# Patient Record
Sex: MALE | Race: WHITE | Employment: FULL TIME | URBAN - METROPOLITAN AREA
[De-identification: names, ages, dates, MRNs, and addresses within clinical notes are randomized per-mention and may not be internally consistent; named-entity substitution may affect disease eponyms.]

---

## 2017-03-07 ENCOUNTER — OFFICE VISIT (OUTPATIENT)
Dept: SURGERY | Age: 37
End: 2017-03-07

## 2017-03-07 VITALS
TEMPERATURE: 98.1 F | WEIGHT: 229 LBS | OXYGEN SATURATION: 99 % | SYSTOLIC BLOOD PRESSURE: 135 MMHG | HEIGHT: 70 IN | HEART RATE: 86 BPM | DIASTOLIC BLOOD PRESSURE: 90 MMHG | RESPIRATION RATE: 18 BRPM | BODY MASS INDEX: 32.78 KG/M2

## 2017-03-07 DIAGNOSIS — M79.89 SOFT TISSUE MASS: Primary | ICD-10-CM

## 2017-03-07 RX ORDER — SODIUM CHLORIDE 0.9 % (FLUSH) 0.9 %
5-10 SYRINGE (ML) INJECTION EVERY 8 HOURS
Status: CANCELLED | OUTPATIENT
Start: 2017-03-07

## 2017-03-07 RX ORDER — SODIUM CHLORIDE 0.9 % (FLUSH) 0.9 %
5-10 SYRINGE (ML) INJECTION AS NEEDED
Status: CANCELLED | OUTPATIENT
Start: 2017-03-07

## 2017-03-07 NOTE — PROGRESS NOTES
HISTORY OF PRESENT ILLNESS  Zheng Easton is a 39 y.o. male. HPI    Review of Systems   Constitutional: Negative. HENT: Positive for tinnitus. Negative for congestion, ear discharge, ear pain, hearing loss, nosebleeds and sore throat. Eyes: Negative. Respiratory: Negative. Negative for stridor. Cardiovascular: Negative. Gastrointestinal: Negative. Genitourinary: Negative. Musculoskeletal: Positive for back pain and myalgias. Negative for falls, joint pain and neck pain. Skin: Negative. Neurological: Positive for headaches. Negative for dizziness, tingling, tremors, sensory change, speech change, focal weakness, seizures and loss of consciousness. Psychiatric/Behavioral: Negative for depression, hallucinations, memory loss, substance abuse and suicidal ideas. The patient is not nervous/anxious and does not have insomnia.         Physical Exam

## 2017-03-07 NOTE — MR AVS SNAPSHOT
Visit Information Date & Time Provider Department Dept. Phone Encounter #  
 3/7/2017 10:30 AM Loly Cai  [address] [id number] Follow-up Instructions Return in about 2 weeks (around 3/21/2017) for Postoperative. Your Appointments 3/29/2017  2:00 PM  
[facility] Appt Note: PO Excisional biopsy of lesion left thigh (frog leg) [address] [place] [address] [address] [place] [address] [address] [facility] Upcoming Health Maintenance Date Due DTaP/Tdap/Td series (1 - Tdap) 3/6/2013 INFLUENZA AGE 9 TO ADULT 8/1/2016 Allergies as of 3/7/2017  Review Complete On: 3/7/2017 By: Adela Morfin LPN No Known Allergies Current Immunizations  Reviewed on 11/20/2014 Name Date Influenza Vaccine 10/1/2014, 10/5/2013 Influenza Vaccine Whole 10/27/2012 Td 3/5/2013 Not reviewed this visit You Were Diagnosed With   
  
 Codes Comments Soft tissue mass    -  Primary ICD-10-CM: M79.9 ICD-9-CM: 729.90 Vitals BP Pulse Temp Resp Height(growth percentile) Weight(growth percentile) 135/90 86 98.1 °F (36.7 °C) (Oral) 18 5' 10\" (1.778 m) 229 lb (103.9 kg) SpO2 BMI Smoking Status 99% 32.86 kg/m2 Never Smoker Vitals History BMI and BSA Data Body Mass Index Body Surface Area  
 32.86 kg/m 2 2.27 m 2 Preferred Pharmacy Pharmacy Name Phone [facility] [address] [phone] Your Updated Medication List  
  
   
This list is accurate as of: 3/7/17 11:38 AM.  Always use your most recent med list.  
  
  
  
  
 cyclobenzaprine 5 mg tablet Commonly known as:  FLEXERIL Take 1 Tab by mouth three (3) times daily as needed.  Indications: FIBROMYALGIA, MUSCLE SPASM  
  
 ibuprofen 800 mg tablet Commonly known as:  MOTRIN Take  by mouth. SUMAtriptan 50 mg tablet Commonly known as:  IMITREX Take 1 Tab by mouth once as needed for Migraine. We Performed the Following SCHEDULE SURGERY [[id number] Custom] Follow-up Instructions Return in about 2 weeks (around 3/21/2017) for Postoperative. Introducing [facility]! Dear Esmer Colon: Thank you for requesting a [employer] account. Our records indicate that you already have an active [employer] account. You can access your account anytime at [email or web address] Did you know that you can access your hospital and ER discharge instructions at any time in [employer]? You can also review all of your test results from your hospital stay or ER visit. Additional Information If you have questions, please visit the Frequently Asked Questions section of the [employer] website at [email or web address] Remember, [employer] is NOT to be used for urgent needs. For medical emergencies, dial 911. Now available from your iPhone and Android! Please provide this summary of care documentation to your next provider. Your primary care clinician is listed as Altagracia Quiros. If you have any questions after today's visit, please call [phone].

## 2017-03-07 NOTE — PROGRESS NOTES
General Surgery Consult    Subjective:      HPI: Patient is a very pleasant 25-year-old male with a past medical history is remarkable for lumbar disc disease, migraine headaches and vitamin D deficiency. He is self-referred for evaluation and management of a lesion in the medial left thigh. States that the mass first appeared approximately 2 years ago is a palpable soft tissue mass. The skin above it then started to turn brown. It has slowly increase in size. Patient Active Problem List    Diagnosis Date Noted    Elevated blood-pressure reading without diagnosis of hypertension 03/22/2013    Mixed hyperlipidemia 03/08/2010    Hyperglycemia 03/08/2010    Headache, migraine 03/08/2010    Lumbar disc disease 03/08/2010    Vitamin D Deficiency Disease (17) 03/08/2010     Past Medical History:   Diagnosis Date    Headache, migraine 3/8/2010    Lumbar disc disease 3/8/2010    Vitamin D deficiency 3/8/2010      Past Surgical History:   Procedure Laterality Date    HX BACK SURGERY      L3-L4  (Dr. Chu Abel), [facility] 92    HX TONSILLECTOMY  12-10-13    Dr. [facility])      Family History   Problem Relation Age of Onset    High Cholesterol Mother     High Cholesterol Maternal Grandmother     Hypertension Maternal Grandmother     Heart Disease Maternal Grandfather 59     AMI- s/p CABG      Social History   Substance Use Topics    Smoking status: Never Smoker    Smokeless tobacco: Never Used    Alcohol use Yes      Comment: 2 cans beer a week      No Known Allergies    Prior to Admission medications    Medication Sig Start Date End Date Taking? Authorizing Provider   ibuprofen (MOTRIN) 800 mg tablet Take  by mouth. Yes Historical Provider   cyclobenzaprine (FLEXERIL) 5 mg tablet Take 1 Tab by mouth three (3) times daily as needed. Indications: FIBROMYALGIA, MUSCLE SPASM 11/27/12  Yes Cheryle Baron, MD   SUMAtriptan (IMITREX) 50 mg tablet Take 1 Tab by mouth once as needed for Migraine. 11/27/12   Champ Sanchez MD       Review of Systems:    14 systems were reviewed. The results are as above in the HPI and otherwise negative. Objective:     Vitals:    03/07/17 1111   BP: 135/90   Pulse: 86   Resp: 18   Temp: 98.1 °F (36.7 °C)   TempSrc: Oral   SpO2: 99%   Weight: 103.9 kg (229 lb)   Height: 5' 10\" (1.778 m)       Physical Exam:  GENERAL: alert, cooperative, no distress, appears stated age,   EYE: conjunctivae/corneas clear. PERRL, EOM's intact. THROAT & NECK: normal and no erythema or exudates noted. ,    LYMPHATIC: Cervical, supraclavicular, and axillary nodes normal. ,   LUNG: clear to auscultation bilaterally,   HEART: regular rate and rhythm, S1, S2 normal, no murmur, click, rub or gallop,   ABDOMEN: soft, non-tender. Bowel sounds normal. No masses,  no organomegaly,   EXTREMITIES:  extremities normal, atraumatic, no cyanosis or edema,   SKIN: 0.5 cm raised firm lesion in the medial left thigh with brown hyperpigmentation of the skin overlying the lesion. NEUROLOGIC: AOx3. Cranial nerves 2-12 and sensation grossly intact. ,     Data Review:  to be done    Impression:     · Patient with a skin lesion of the left medial thigh of unclear etiology suspicious for malignancy.     Plan:     · Excisional biopsy of the skin lesion of the left medial thigh  · Consent on chart  · Preoperative orders written    Signed By: Nemesio Oliva MD     March 7, 2017

## 2017-03-08 RX ORDER — CHOLECALCIFEROL (VITAMIN D3) 125 MCG
CAPSULE ORAL DAILY
COMMUNITY

## 2017-03-14 DIAGNOSIS — M79.89 SOFT TISSUE MASS: Primary | ICD-10-CM

## 2017-03-15 ENCOUNTER — HOSPITAL ENCOUNTER (OUTPATIENT)
Dept: LAB | Age: 37
Discharge: HOME | End: 2017-03-15
Payer: COMMERCIAL

## 2017-03-15 ENCOUNTER — HOSPITAL ENCOUNTER (OUTPATIENT)
Dept: GENERAL RADIOLOGY | Age: 37
Discharge: HOME | End: 2017-03-15
Payer: COMMERCIAL

## 2017-03-15 DIAGNOSIS — M79.89 SOFT TISSUE MASS: ICD-10-CM

## 2017-03-15 DIAGNOSIS — R22.40 MASS OF THIGH, UNSPECIFIED LATERALITY: Primary | ICD-10-CM

## 2017-03-15 LAB
ALBUMIN SERPL BCP-MCNC: 4.4 G/DL (ref 3.4–5)
ALBUMIN/GLOB SERPL: 1.3 {RATIO} (ref 0.8–1.7)
ALP SERPL-CCNC: 68 U/L (ref 45–117)
ALT SERPL-CCNC: 31 U/L (ref 16–61)
ANION GAP BLD CALC-SCNC: 5 MMOL/L (ref 3–18)
AST SERPL W P-5'-P-CCNC: 15 U/L (ref 15–37)
BILIRUB SERPL-MCNC: 0.7 MG/DL (ref 0.2–1)
BUN SERPL-MCNC: 11 MG/DL (ref 7–18)
BUN/CREAT SERPL: 12 (ref 12–20)
CALCIUM SERPL-MCNC: 9.1 MG/DL (ref 8.5–10.1)
CHLORIDE SERPL-SCNC: 105 MMOL/L (ref 100–108)
CO2 SERPL-SCNC: 30 MMOL/L (ref 21–32)
CREAT SERPL-MCNC: 0.93 MG/DL (ref 0.6–1.3)
GLOBULIN SER CALC-MCNC: 3.3 G/DL (ref 2–4)
GLUCOSE SERPL-MCNC: 89 MG/DL (ref 74–99)
POTASSIUM SERPL-SCNC: 4.2 MMOL/L (ref 3.5–5.5)
PROT SERPL-MCNC: 7.7 G/DL (ref 6.4–8.2)
SODIUM SERPL-SCNC: 140 MMOL/L (ref 136–145)

## 2017-03-15 PROCEDURE — 93005 ELECTROCARDIOGRAM TRACING: CPT

## 2017-03-15 PROCEDURE — 71020 XR CHEST PA LAT: CPT

## 2017-03-15 PROCEDURE — 36415 COLL VENOUS BLD VENIPUNCTURE: CPT | Performed by: SURGERY

## 2017-03-15 PROCEDURE — 80053 COMPREHEN METABOLIC PANEL: CPT | Performed by: SURGERY

## 2017-03-16 ENCOUNTER — ANESTHESIA EVENT (OUTPATIENT)
Dept: SURGERY | Age: 37
End: 2017-03-16
Payer: COMMERCIAL

## 2017-03-16 ENCOUNTER — HOSPITAL ENCOUNTER (OUTPATIENT)
Dept: LAB | Age: 37
Discharge: HOME | End: 2017-03-16
Payer: COMMERCIAL

## 2017-03-16 LAB
ATRIAL RATE: 74 BPM
BASOPHILS # BLD AUTO: 0 K/UL (ref 0–0.1)
BASOPHILS # BLD: 0 % (ref 0–2)
CALCULATED P AXIS, ECG09: 19 DEGREES
CALCULATED R AXIS, ECG10: 53 DEGREES
CALCULATED T AXIS, ECG11: 9 DEGREES
DIAGNOSIS, 93000: NORMAL
DIFFERENTIAL METHOD BLD: ABNORMAL
EOSINOPHIL # BLD: 0 K/UL (ref 0–0.4)
EOSINOPHIL NFR BLD: 1 % (ref 0–5)
ERYTHROCYTE [DISTWIDTH] IN BLOOD BY AUTOMATED COUNT: 12.9 % (ref 11.6–14.5)
HCT VFR BLD AUTO: 44.5 % (ref 36–48)
HGB BLD-MCNC: 14.7 G/DL (ref 13–16)
LYMPHOCYTES # BLD AUTO: 29 % (ref 21–52)
LYMPHOCYTES # BLD: 1.5 K/UL (ref 0.9–3.6)
MCH RBC QN AUTO: 31.3 PG (ref 24–34)
MCHC RBC AUTO-ENTMCNC: 33 G/DL (ref 31–37)
MCV RBC AUTO: 94.7 FL (ref 74–97)
MONOCYTES # BLD: 0.5 K/UL (ref 0.05–1.2)
MONOCYTES NFR BLD AUTO: 9 % (ref 3–10)
NEUTS SEG # BLD: 3.3 K/UL (ref 1.8–8)
NEUTS SEG NFR BLD AUTO: 61 % (ref 40–73)
P-R INTERVAL, ECG05: 158 MS
PLATELET # BLD AUTO: 132 K/UL (ref 135–420)
PMV BLD AUTO: 11.9 FL (ref 9.2–11.8)
Q-T INTERVAL, ECG07: 380 MS
QRS DURATION, ECG06: 96 MS
QTC CALCULATION (BEZET), ECG08: 421 MS
RBC # BLD AUTO: 4.7 M/UL (ref 4.7–5.5)
VENTRICULAR RATE, ECG03: 74 BPM
WBC # BLD AUTO: 5.4 K/UL (ref 4.6–13.2)

## 2017-03-16 PROCEDURE — 85025 COMPLETE CBC W/AUTO DIFF WBC: CPT | Performed by: SURGERY

## 2017-03-16 PROCEDURE — 36415 COLL VENOUS BLD VENIPUNCTURE: CPT | Performed by: SURGERY

## 2017-03-17 ENCOUNTER — HOSPITAL ENCOUNTER (OUTPATIENT)
Age: 37
Setting detail: OUTPATIENT SURGERY
Discharge: HOME | End: 2017-03-17
Attending: SURGERY | Admitting: SURGERY
Payer: COMMERCIAL

## 2017-03-17 ENCOUNTER — ANESTHESIA (OUTPATIENT)
Dept: SURGERY | Age: 37
End: 2017-03-17
Payer: COMMERCIAL

## 2017-03-17 ENCOUNTER — SURGERY (OUTPATIENT)
Age: 37
End: 2017-03-17

## 2017-03-17 VITALS
HEIGHT: 70 IN | BODY MASS INDEX: 32.21 KG/M2 | HEART RATE: 75 BPM | WEIGHT: 225 LBS | OXYGEN SATURATION: 96 % | DIASTOLIC BLOOD PRESSURE: 88 MMHG | SYSTOLIC BLOOD PRESSURE: 131 MMHG | RESPIRATION RATE: 12 BRPM | TEMPERATURE: 97.8 F

## 2017-03-17 PROCEDURE — 74011000250 HC RX REV CODE- 250

## 2017-03-17 PROCEDURE — 76060000032 HC ANESTHESIA 0.5 TO 1 HR: Performed by: SURGERY

## 2017-03-17 PROCEDURE — 74011000250 HC RX REV CODE- 250: Performed by: SURGERY

## 2017-03-17 PROCEDURE — 74011250636 HC RX REV CODE- 250/636: Performed by: SURGERY

## 2017-03-17 PROCEDURE — 76210000021 HC REC RM PH II 0.5 TO 1 HR: Performed by: SURGERY

## 2017-03-17 PROCEDURE — 76210000016 HC OR PH I REC 1 TO 1.5 HR: Performed by: SURGERY

## 2017-03-17 PROCEDURE — 88304 TISSUE EXAM BY PATHOLOGIST: CPT | Performed by: SURGERY

## 2017-03-17 PROCEDURE — 77030011640 HC PAD GRND REM COVD -A: Performed by: SURGERY

## 2017-03-17 PROCEDURE — 77030003028 HC SUT VCRL J&J -A: Performed by: SURGERY

## 2017-03-17 PROCEDURE — 74011250636 HC RX REV CODE- 250/636: Performed by: NURSE ANESTHETIST, CERTIFIED REGISTERED

## 2017-03-17 PROCEDURE — 88305 TISSUE EXAM BY PATHOLOGIST: CPT | Performed by: SURGERY

## 2017-03-17 PROCEDURE — 77030020782 HC GWN BAIR PAWS FLX 3M -B: Performed by: SURGERY

## 2017-03-17 PROCEDURE — 77030031139 HC SUT VCRL2 J&J -A: Performed by: SURGERY

## 2017-03-17 PROCEDURE — 76010000138 HC OR TIME 0.5 TO 1 HR: Performed by: SURGERY

## 2017-03-17 PROCEDURE — 77030010509 HC AIRWY LMA MSK TELE -A: Performed by: ANESTHESIOLOGY

## 2017-03-17 PROCEDURE — 74011250636 HC RX REV CODE- 250/636

## 2017-03-17 RX ORDER — SODIUM CHLORIDE 0.9 % (FLUSH) 0.9 %
5-10 SYRINGE (ML) INJECTION AS NEEDED
Status: DISCONTINUED | OUTPATIENT
Start: 2017-03-17 | End: 2017-03-17 | Stop reason: HOSPADM

## 2017-03-17 RX ORDER — ONDANSETRON 2 MG/ML
INJECTION INTRAMUSCULAR; INTRAVENOUS AS NEEDED
Status: DISCONTINUED | OUTPATIENT
Start: 2017-03-17 | End: 2017-03-17 | Stop reason: HOSPADM

## 2017-03-17 RX ORDER — DEXAMETHASONE SODIUM PHOSPHATE 4 MG/ML
INJECTION, SOLUTION INTRA-ARTICULAR; INTRALESIONAL; INTRAMUSCULAR; INTRAVENOUS; SOFT TISSUE AS NEEDED
Status: DISCONTINUED | OUTPATIENT
Start: 2017-03-17 | End: 2017-03-17 | Stop reason: HOSPADM

## 2017-03-17 RX ORDER — SODIUM CHLORIDE, SODIUM LACTATE, POTASSIUM CHLORIDE, CALCIUM CHLORIDE 600; 310; 30; 20 MG/100ML; MG/100ML; MG/100ML; MG/100ML
50 INJECTION, SOLUTION INTRAVENOUS CONTINUOUS
Status: DISCONTINUED | OUTPATIENT
Start: 2017-03-17 | End: 2017-03-17 | Stop reason: HOSPADM

## 2017-03-17 RX ORDER — MIDAZOLAM HYDROCHLORIDE 1 MG/ML
INJECTION, SOLUTION INTRAMUSCULAR; INTRAVENOUS AS NEEDED
Status: DISCONTINUED | OUTPATIENT
Start: 2017-03-17 | End: 2017-03-17 | Stop reason: HOSPADM

## 2017-03-17 RX ORDER — SODIUM CHLORIDE, SODIUM LACTATE, POTASSIUM CHLORIDE, CALCIUM CHLORIDE 600; 310; 30; 20 MG/100ML; MG/100ML; MG/100ML; MG/100ML
75 INJECTION, SOLUTION INTRAVENOUS CONTINUOUS
Status: DISCONTINUED | OUTPATIENT
Start: 2017-03-17 | End: 2017-03-17 | Stop reason: HOSPADM

## 2017-03-17 RX ORDER — ONDANSETRON 2 MG/ML
4 INJECTION INTRAMUSCULAR; INTRAVENOUS AS NEEDED
Status: DISCONTINUED | OUTPATIENT
Start: 2017-03-17 | End: 2017-03-17 | Stop reason: HOSPADM

## 2017-03-17 RX ORDER — ACETAMINOPHEN 325 MG/1
650 TABLET ORAL 3 TIMES DAILY
Status: DISCONTINUED | OUTPATIENT
Start: 2017-03-17 | End: 2017-03-17 | Stop reason: HOSPADM

## 2017-03-17 RX ORDER — FENTANYL CITRATE 50 UG/ML
25 INJECTION, SOLUTION INTRAMUSCULAR; INTRAVENOUS
Status: DISCONTINUED | OUTPATIENT
Start: 2017-03-17 | End: 2017-03-17 | Stop reason: CLARIF

## 2017-03-17 RX ORDER — PROPOFOL 10 MG/ML
INJECTION, EMULSION INTRAVENOUS AS NEEDED
Status: DISCONTINUED | OUTPATIENT
Start: 2017-03-17 | End: 2017-03-17 | Stop reason: HOSPADM

## 2017-03-17 RX ORDER — BUPIVACAINE HYDROCHLORIDE AND EPINEPHRINE 2.5; 5 MG/ML; UG/ML
INJECTION, SOLUTION EPIDURAL; INFILTRATION; INTRACAUDAL; PERINEURAL AS NEEDED
Status: DISCONTINUED | OUTPATIENT
Start: 2017-03-17 | End: 2017-03-17 | Stop reason: HOSPADM

## 2017-03-17 RX ORDER — FENTANYL CITRATE 50 UG/ML
INJECTION, SOLUTION INTRAMUSCULAR; INTRAVENOUS AS NEEDED
Status: DISCONTINUED | OUTPATIENT
Start: 2017-03-17 | End: 2017-03-17 | Stop reason: HOSPADM

## 2017-03-17 RX ORDER — FENTANYL CITRATE 50 UG/ML
25 INJECTION, SOLUTION INTRAMUSCULAR; INTRAVENOUS
Status: DISCONTINUED | OUTPATIENT
Start: 2017-03-17 | End: 2017-03-17 | Stop reason: HOSPADM

## 2017-03-17 RX ORDER — SODIUM CHLORIDE 0.9 % (FLUSH) 0.9 %
5-10 SYRINGE (ML) INJECTION EVERY 8 HOURS
Status: DISCONTINUED | OUTPATIENT
Start: 2017-03-17 | End: 2017-03-17 | Stop reason: HOSPADM

## 2017-03-17 RX ORDER — FAMOTIDINE 20 MG/1
20 TABLET, FILM COATED ORAL ONCE
Status: DISCONTINUED | OUTPATIENT
Start: 2017-03-17 | End: 2017-03-17 | Stop reason: HOSPADM

## 2017-03-17 RX ORDER — LIDOCAINE HYDROCHLORIDE 20 MG/ML
INJECTION, SOLUTION EPIDURAL; INFILTRATION; INTRACAUDAL; PERINEURAL AS NEEDED
Status: DISCONTINUED | OUTPATIENT
Start: 2017-03-17 | End: 2017-03-17 | Stop reason: HOSPADM

## 2017-03-17 RX ORDER — CEFAZOLIN SODIUM 2 G/50ML
2 SOLUTION INTRAVENOUS
Status: COMPLETED | OUTPATIENT
Start: 2017-03-17 | End: 2017-03-17

## 2017-03-17 RX ORDER — TRAMADOL HYDROCHLORIDE 50 MG/1
100 TABLET ORAL
Qty: 50 TAB | Refills: 0 | Status: SHIPPED | OUTPATIENT
Start: 2017-03-17

## 2017-03-17 RX ADMIN — BUPIVACAINE HYDROCHLORIDE AND EPINEPHRINE BITARTRATE 20 ML: 2.5; .0091 INJECTION, SOLUTION EPIDURAL; INFILTRATION; INTRACAUDAL; PERINEURAL at 08:09

## 2017-03-17 RX ADMIN — FENTANYL CITRATE 25 MCG: 50 INJECTION, SOLUTION INTRAMUSCULAR; INTRAVENOUS at 07:52

## 2017-03-17 RX ADMIN — FENTANYL CITRATE 25 MCG: 50 INJECTION, SOLUTION INTRAMUSCULAR; INTRAVENOUS at 07:45

## 2017-03-17 RX ADMIN — ONDANSETRON 4 MG: 2 INJECTION INTRAMUSCULAR; INTRAVENOUS at 08:07

## 2017-03-17 RX ADMIN — SODIUM CHLORIDE, SODIUM LACTATE, POTASSIUM CHLORIDE, AND CALCIUM CHLORIDE 50 ML/HR: 600; 310; 30; 20 INJECTION, SOLUTION INTRAVENOUS at 06:56

## 2017-03-17 RX ADMIN — LIDOCAINE HYDROCHLORIDE 60 MG: 20 INJECTION, SOLUTION EPIDURAL; INFILTRATION; INTRACAUDAL; PERINEURAL at 07:45

## 2017-03-17 RX ADMIN — PROPOFOL 200 MG: 10 INJECTION, EMULSION INTRAVENOUS at 07:45

## 2017-03-17 RX ADMIN — MIDAZOLAM HYDROCHLORIDE 2 MG: 1 INJECTION, SOLUTION INTRAMUSCULAR; INTRAVENOUS at 07:30

## 2017-03-17 RX ADMIN — DEXAMETHASONE SODIUM PHOSPHATE 4 MG: 4 INJECTION, SOLUTION INTRA-ARTICULAR; INTRALESIONAL; INTRAMUSCULAR; INTRAVENOUS; SOFT TISSUE at 07:52

## 2017-03-17 RX ADMIN — CEFAZOLIN SODIUM 2 G: 2 SOLUTION INTRAVENOUS at 07:30

## 2017-03-17 RX ADMIN — FENTANYL CITRATE 25 MCG: 50 INJECTION INTRAMUSCULAR; INTRAVENOUS at 09:09

## 2017-03-17 NOTE — IP AVS SNAPSHOT
Pedrito Stevens 
 
 
 [address] [facility] [address] [phone] Patient: Jenny Wyman MRN: [id number] :1980 You are allergic to the following Allergen Reactions Hydrocodone Swelling Other (comments) Headaches Swelling of tongue 
insomnia Recent Documentation Height Weight BMI Smoking Status 1.778 m 102.1 kg 32.28 kg/m2 Never Smoker Emergency Contacts Name Discharge Info Relation Home Work Mobile Guadalupe Galvin DISCHARGE CAREGIVER [3] Spouse [3] [phone] About your hospitalization You were admitted on:  2017 You last received care in the:  [facility] PACU You were discharged on:  2017 Unit phone number:  [phone] Why you were hospitalized Your primary diagnosis was:  Not on File Providers Seen During Your Hospitalizations Provider Role Specialty Primary office phone Jenni Napoles MD Attending Provider General Surgery [phone] Your Primary Care Physician (PCP) Primary Care Physician Office Phone Office Fax OTHER, PHYS ** None ** ** None ** Follow-up Information Follow up With Details Comments Contact Info Ada Gregorio MD   Patient can only remember the practice name and not the physician Jenni Napoles MD Follow up in 2 week(s)  [address] [facility] [address] [facility] 
[phone] Your Appointments 2017  2:00 PM EDT Follow Up with MD SHY [facility] [address] [facility]  
[phone] Current Discharge Medication List  
  
START taking these medications Dose & Instructions Dispensing Information Comments Morning Noon Evening Bedtime  
 traMADol 50 mg tablet Commonly known as:  Agustín Montesinos  
   
 Your last dose was: Your next dose is:    
   
   
 Dose:  100 mg Take 2 Tabs by mouth every six (6) hours as needed for Pain. Max Daily Amount: 400 mg. Quantity:  50 Tab Refills:  0 CONTINUE these medications which have NOT CHANGED Dose & Instructions Dispensing Information Comments Morning Noon Evening Bedtime  
 cyclobenzaprine 5 mg tablet Commonly known as:  FLEXERIL Your last dose was: Your next dose is:    
   
   
 Dose:  5 mg Take 1 Tab by mouth three (3) times daily as needed. Indications: FIBROMYALGIA, MUSCLE SPASM Quantity:  30 Tab Refills:  1 FISH OIL PO Your last dose was: Your next dose is:    
   
   
 Dose:  2 Cap Take 2 Caps by mouth. Refills:  0  
     
   
   
   
  
 ibuprofen 800 mg tablet Commonly known as:  MOTRIN Your last dose was: Your next dose is: Take  by mouth. Refills:  0 SUMAtriptan 50 mg tablet Commonly known as:  IMITREX Your last dose was: Your next dose is:    
   
   
 Dose:  50 mg Take 1 Tab by mouth once as needed for Migraine. Quantity:  10 Tab Refills:  2 VITAMIN D3 2,000 unit Tab Generic drug:  cholecalciferol (vitamin D3) Your last dose was: Your next dose is: Take  by mouth daily. Refills:  0 Where to Get Your Medications Information on where to get these meds will be given to you by the nurse or doctor. ! Ask your nurse or doctor about these medications  
  traMADol 50 mg tablet Discharge Instructions Pt may shower. Allow soap and water to run over the incision. No driving or operating heavy machinery while on narcotic pain medications. No strenuous activity or contact sports for two weeks. No lifting greater than 15 lbs for 2 weeks. Call MD for any redness, swelling, bleeding or pus at the incision. Also call for any nausea, vomiting, increased pain or pain uncontrolled by pain medicine. Melanoma: Care Instructions Your Care Instructions Melanoma is a form of skin cancer in which abnormal skin cells grow out of control. It helps to learn about this condition and what can be done about it. It is very important for you to take good care of your skin from now on so that you do not get melanoma again. After treatment, you will need regular checkups with your doctor to make sure melanoma has not come back. Your doctor also will watch to be sure that you do not develop another melanoma. Melanoma shows up mostly on skin that is not regularly covered up, but it can show up anywhere on the body. Melanoma is most often found early, when it can be cured. The most common treatment is surgery to remove it. Sometimes lymph nodes near the cancer also are removed. You also may get medicine that kills cancer cells (chemotherapy) or medicine that boosts your immune system (immunotherapy). You may want plastic surgery if you have a very noticeable scar after the surgery. When you find out that you have cancer, you may feel many emotions and may need some help coping. Seek out family, friends, and counselors for support. You also can do things at home to make yourself feel better while you go through treatment. Call the [facility] ([phone]) or visit its website at [address] [employer] for more information. Follow-up care is a key part of your treatment and safety. Be sure to make and go to all appointments, and call your doctor if you are having problems. It's also a good idea to know your test results and keep a list of the medicines you take. How can you care for yourself at home? · Learn the most important warning signs for melanomaa change in the size, shape, or color of a mole or other skin growth, such as a birthmark. · Check all the skin on your body once a month for skin growths or other changes, such as changes in color and feel of the skin. ¨  front of a full-length mirror. Look carefully at the front and back of your body. Then look at your right and left sides with your arms raised. PA[facility] your elbows and look carefully at your forearms, the back of your upper arms, and your palms. ¨ Look at your feet, the bottoms of your feet, and the spaces between your toes. ¨ Use a hand mirror to look at the back of your legs, the back of your neck, and your back, rear end (buttocks), and genital area. Part the hair on your head to look at your scalp. · If you see a change in a skin growth, contact your doctor. Look for: ¨ A mole that bleeds. ¨ A fast-growing mole. ¨ A scaly or crusted growth on the skin. ¨ A sore that will not heal. 
· Take your medicines exactly as prescribed. Call your doctor if you think you are having a problem with your medicine. You will get more details on the specific medicines your doctor prescribes. · If you have pain, follow your doctor's instructions to relieve it. Pain from cancer and surgery can almost always be controlled. Use pain medicine when you first notice pain, before it becomes severe. · Eat healthy food. If you do not feel like eating, try to eat food that has protein and extra calories to keep up your strength and prevent weight loss. Drink liquid meal replacements for extra calories and protein. Try to eat your main meal early. · Get some physical activity every day, but do not get too tired. Keep doing the hobbies you enjoy as your energy allows. · Take steps to control your stress and workload. Learn relaxation techniques. ¨ Share your feelings. Stress and tension affect our emotions. By expressing your feelings to others, you may be able to understand and cope with them. ¨ Consider joining a support group.  Talking about a problem with your spouse, a good friend, or other people with similar problems is a good way to reduce tension and stress. ¨ Express yourself through art. Try writing, crafts, dance, or art to relieve stress. Some dance, writing, or art groups may be available just for people who have cancer. ¨ Be kind to your body and mind. Getting enough sleep, eating a healthy diet, and taking time to do things you enjoy can contribute to an overall feeling of balance in your life and help reduce stress. ¨ Get help if you need it. Discuss your concerns with your doctor or counselor. · If you are vomiting or have diarrhea: ¨ Drink plenty of fluids (enough so that your urine is light yellow or clear like water) to prevent dehydration. Choose water and other caffeine-free clear liquids. If you have kidney, heart, or liver disease and have to limit fluids, talk with your doctor before you increase the amount of fluids you drink. ¨ When you are able to eat, try clear soups, mild foods, and liquids until all symptoms are gone for 12 to 48 hours. Other good choices include dry toast, crackers, cooked cereal, and gelatin dessert, such as Jell-O. 
· Do not smoke. Smoking can slow healing. If you need help quitting, talk to your doctor about stop-smoking programs and medicines. These can increase your chances of quitting for good. · If you have not already done so, prepare a list of advance directives. Advance directives are instructions to your doctor and family members about what kind of care you want if you become unable to speak or express yourself. Protect your skin · Always wear sunscreen on exposed skin. Make sure to use a broad-spectrum sunscreen that has a sun protection factor (SPF) of 30 or higher. Use it every day, even when it is cloudy. While you are outdoors, apply more sunscreen every 2 to 3 hours or anytime your skin gets wet.  
· Wear a wide-brimmed hat, a long-sleeved shirt, and pants if you are going to be outdoors for very long. · Stay out of the sun during the midday hours (10 a.m. to 4 p.m.), when UV rays are strongest. 
· Avoid sunlamps and tanning salons. When should you call for help? Watch closely for changes in your health, and be sure to contact your doctor if: 
· You have a skin growth that now looks different than it did before. It may have changed in size, color, shape, or how it looks. · You have a new growth that does not go away. · You have a skin growth that you are worried about. · You notice swelling in your armpits, groin, or neck. Where can you learn more? Go to http://hubert-vicky.info/. Enter Z413 in the search box to learn more about \"Melanoma: Care Instructions. \" Current as of: July 26, 2016 Content Version: 11.1 © 1373-0827 [employer]. Care instructions adapted under license by [employer] (which disclaims liability or warranty for this information). If you have questions about a medical condition or this instruction, always ask your healthcare professional. [place] [address] any warranty or liability for your use of this information. DISCHARGE SUMMARY from Nurse The following personal items are in your possession at time of discharge: 
 
Dental Appliances: None Visual Aid: Glasses Home Medications: None Jewelry: None Clothing: Pants, Shirt, Footwear, Socks, Undergarments, Jacket/Coat Other Valuables: None PATIENT INSTRUCTIONS: 
 
 
F-face looks uneven A-arms unable to move or move unevenly S-speech slurred or non-existent T-time-call 911 as soon as signs and symptoms begin-DO NOT go Back to bed or wait to see if you get better-TIME IS BRAIN. Warning Signs of HEART ATTACK Call 911 if you have these symptoms: 
? Chest discomfort.  Most heart attacks involve discomfort in the center of the chest that lasts more than a few minutes, or that goes away and comes back. It can feel like uncomfortable pressure, squeezing, fullness, or pain. ? Discomfort in other areas of the upper body. Symptoms can include pain or discomfort in one or both arms, the back, neck, jaw, or stomach. ? Shortness of breath with or without chest discomfort. ? Other signs may include breaking out in a cold sweat, nausea, or lightheadedness. Don't wait more than five minutes to call [address]! Fast action can save your life. Calling 911 is almost always the fastest way to get lifesaving treatment. Emergency Medical Services staff can begin treatment when they arrive  up to an hour sooner than if someone gets to the hospital by car. The discharge information has been reviewed with the patient and spouse. The patient and spouse verbalized understanding. Discharge medications reviewed with the patient and spouse and appropriate educational materials and side effects teaching were provided. Tramadol (By mouth) Tramadol (TRAM-a-dol) Treats moderate to severe pain. This medicine is a narcotic pain reliever. Brand Name(s):ConZip, FusePaq Synapryn, Ryzolt, Ultram, Ultram ER, traMADol HCl There may be other brand names for this medicine. When This Medicine Should Not Be Used: This medicine is not right for everyone. Do not use if you had an allergic reaction to tramadol or other narcotic medicine. Tell your doctor if you have severe asthma or other breathing problems. How to Use This Medicine:  
Liquid, Capsule, Tablet, Dissolving Tablet, Long Acting Tablet · Take your medicine as directed. Your dose may need to be changed several times to find what works best for you. · Make sure your hands are dry before you handle the disintegrating tablet. Peel back the foil from the blister pack, then remove the tablet. Do not push the tablet through the foil. Place the tablet in your mouth. After it has melted, swallow or take a drink of water. · Swallow the extended-release tablet whole. Do not crush, break, or chew it. · Drink plenty of liquids to help avoid constipation. · Missed dose: Take a dose as soon as you remember. If it is almost time for your next dose, wait until then and take a regular dose. Do not take extra medicine to make up for a missed dose. · Store the medicine in a closed container at room temperature, away from heat, moisture, and direct light. Drugs and Foods to Avoid: Ask your doctor or pharmacist before using any other medicine, including over-the-counter medicines, vitamins, and herbal products. · Some medicines and foods can affect how tramadol works. Tell your doctor if you are using an MAO inhibitor or medicine for depression (such as amitriptyline, fluoxetine, paroxetine). · Tell your doctor if you are also using any of the following: 
¨ Gordon's wort, digoxin, erythromycin, ketoconazole, linezolid, lithium, quinidine, rifampin, promethazine, carbamazepine, or cyclobenzaprine ¨ Blood thinner (such as warfarin), medicine for migraine headaches, or another narcotic medicine · Tell your doctor if you use anything else that makes you sleepy. Some examples are allergy medicine, narcotic pain medicine, and alcohol. · Do not drink alcohol while you are using this medicine. Warnings While Using This Medicine: · Tell your doctor if you are pregnant or breastfeeding, or if you have kidney disease, liver disease (including cirrhosis), or breathing problems. Tell your doctor if you have a history of head injury, seizures, drug addiction, or depression or similar emotional problems. · This medicine may cause the following problems: 
¨ High risk of overdose ¨ Serotonin syndrome · This medicine may make you dizzy or drowsy. Do not drive or doing anything else that could be dangerous until you know how this medicine affects you. · This medicine can be habit-forming. Do not use more than your prescribed dose. Call your doctor if you think your medicine is not working. · Do not stop using this medicine suddenly. Your doctor will need to slowly decrease your dose before you stop it completely. · Tell any doctor or dentist who treats you that you are using this medicine. · Some forms of this medicine contain phenylalanine (aspartame). · This medicine may cause constipation, especially with long-term use. Ask your doctor if you should use a laxative to prevent and treat constipation. · Keep all medicine out of the reach of children. Never share your medicine with anyone. Possible Side Effects While Using This Medicine:  
Call your doctor right away if you notice any of these side effects: · Allergic reaction: Itching or hives, swelling in your face or hands, swelling or tingling in your mouth or throat, chest tightness, trouble breathing · Anxiety, restlessness, fast heartbeat, fever, sweating, muscle spasms, nausea, vomiting, diarrhea, seeing or hearing things that are not there · Blistering, peeling, or red skin rash · Lightheadedness, dizziness, or fainting · Seizures · Severe sleepiness or unusual drowsiness · Trouble breathing If you notice these less serious side effects, talk with your doctor: · Feeling nervous or shaky · Headache · Mild itching · Nausea, vomiting, constipation, loss of appetite · Weakness If you notice other side effects that you think are caused by this medicine, tell your doctor. Call your doctor for medical advice about side effects. You may report side effects to FDA at 8-804-FDA-6722 © 2016 [address] is for End User's use only and may not be sold, redistributed or otherwise used for commercial purposes. The above information is an  only. It is not intended as medical advice for individual conditions or treatments.  Talk to your doctor, nurse or pharmacist before following any medical regimen to see if it is safe and effective for you. Discharge Orders None [employer] Announcement We are excited to announce that we are making your provider's discharge notes available to you in [employer]. You will see these notes when they are completed and signed by the physician that discharged you from your recent hospital stay. If you have any questions or concerns about any information you see in [employer], please call the Health Information Department where you were seen or reach out to your Primary Care Provider for more information about your plan of care. Introducing [facility]! Dear Harleen Russell: Thank you for requesting a [employer] account. Our records indicate that you already have an active [employer] account. You can access your account anytime at [email or web address] Did you know that you can access your hospital and ER discharge instructions at any time in [employer]? You can also review all of your test results from your hospital stay or ER visit. Additional Information If you have questions, please visit the Frequently Asked Questions section of the [employer] website at [email or web address] Remember, [employer] is NOT to be used for urgent needs. For medical emergencies, dial 911. Now available from your iPhone and Android! General Information Please provide this summary of care documentation to your next provider. Patient Signature:  ____________________________________________________________ Date:  ____________________________________________________________  
  
Caroline Hogue Provider Signature:  ____________________________________________________________ Date:  ____________________________________________________________

## 2017-03-17 NOTE — ANESTHESIA POSTPROCEDURE EVALUATION
Post-Anesthesia Evaluation and Assessment    Patient: Sanaz Burnette MRN: [id number]  SSN: [id number]    YOB: 1980  Age: 39 y.o. Sex: male      Data from PACU flowsheet    Cardiovascular Function/Vital Signs  Visit Vitals    /88 (BP 1 Location: Left arm, BP Patient Position: At rest)    Pulse 75    Temp 36.6 °C (97.8 °F)    Resp 12    Ht 5' 10\" (1.778 m)    Wt 102.1 kg (225 lb)    SpO2 96%    BMI 32.28 kg/m2       Patient is status post general anesthesia for Procedure(s):  EXCISIONAL BIOPSY OF LESION LEFT THIGH . Nausea/Vomiting: controlled    Postoperative hydration reviewed and adequate. Pain:  Pain Scale 1: Numeric (0 - 10) (03/17/17 0934)  Pain Intensity 1: 3 (03/17/17 0934)   Managed      Mental Status and Level of Consciousness: Alert and oriented     Pulmonary Status:   O2 Device: Room air (03/17/17 0934)   Adequate oxygenation and airway patent    Complications related to anesthesia: None    Post-anesthesia assessment completed.  No concerns    Signed By: Alyssa Abrams MD     March 17, 2017

## 2017-03-17 NOTE — DISCHARGE INSTRUCTIONS
Pt may shower. Allow soap and water to run over the incision. No driving or operating heavy machinery while on narcotic pain medications. No strenuous activity or contact sports for two weeks. No lifting greater than 15 lbs for 2 weeks. Call MD for any redness, swelling, bleeding or pus at the incision. Also call for any nausea, vomiting, increased pain or pain uncontrolled by pain medicine. Melanoma: Care Instructions  Your Care Instructions  Melanoma is a form of skin cancer in which abnormal skin cells grow out of control. It helps to learn about this condition and what can be done about it. It is very important for you to take good care of your skin from now on so that you do not get melanoma again. After treatment, you will need regular checkups with your doctor to make sure melanoma has not come back. Your doctor also will watch to be sure that you do not develop another melanoma. Melanoma shows up mostly on skin that is not regularly covered up, but it can show up anywhere on the body. Melanoma is most often found early, when it can be cured. The most common treatment is surgery to remove it. Sometimes lymph nodes near the cancer also are removed. You also may get medicine that kills cancer cells (chemotherapy) or medicine that boosts your immune system (immunotherapy). You may want plastic surgery if you have a very noticeable scar after the surgery. When you find out that you have cancer, you may feel many emotions and may need some help coping. Seek out family, friends, and counselors for support. You also can do things at home to make yourself feel better while you go through treatment. Call the [employer] (0-206.364.1860) or visit its website at [address] for more information. Follow-up care is a key part of your treatment and safety. Be sure to make and go to all appointments, and call your doctor if you are having problems.  It's also a good idea to know your test results and keep a list of the medicines you take. How can you care for yourself at home? · Learn the most important warning signs for melanoma--a change in the size, shape, or color of a mole or other skin growth, such as a birthmark. · Check all the skin on your body once a month for skin growths or other changes, such as changes in color and feel of the skin. ¨  front of a full-length mirror. Look carefully at the front and back of your body. Then look at your right and left sides with your arms raised. [facility] your elbows and look carefully at your forearms, the back of your upper arms, and your palms. ¨ Look at your feet, the bottoms of your feet, and the spaces between your toes. ¨ Use a hand mirror to look at the back of your legs, the back of your neck, and your back, rear end (buttocks), and genital area. Part the hair on your head to look at your scalp. · If you see a change in a skin growth, contact your doctor. Look for:  ¨ A mole that bleeds. ¨ A fast-growing mole. ¨ A scaly or crusted growth on the skin. ¨ A sore that will not heal.  · Take your medicines exactly as prescribed. Call your doctor if you think you are having a problem with your medicine. You will get more details on the specific medicines your doctor prescribes. · If you have pain, follow your doctor's instructions to relieve it. Pain from cancer and surgery can almost always be controlled. Use pain medicine when you first notice pain, before it becomes severe. · Eat healthy food. If you do not feel like eating, try to eat food that has protein and extra calories to keep up your strength and prevent weight loss. Drink liquid meal replacements for extra calories and protein. Try to eat your main meal early. · Get some physical activity every day, but do not get too tired. Keep doing the hobbies you enjoy as your energy allows. · Take steps to control your stress and workload. Learn relaxation techniques.   ¨ Share your feelings. Stress and tension affect our emotions. By expressing your feelings to others, you may be able to understand and cope with them. ¨ Consider joining a support group. Talking about a problem with your spouse, a good friend, or other people with similar problems is a good way to reduce tension and stress. ¨ Express yourself through art. Try writing, crafts, dance, or art to relieve stress. Some dance, writing, or art groups may be available just for people who have cancer. ¨ Be kind to your body and mind. Getting enough sleep, eating a healthy diet, and taking time to do things you enjoy can contribute to an overall feeling of balance in your life and help reduce stress. ¨ Get help if you need it. Discuss your concerns with your doctor or counselor. · If you are vomiting or have diarrhea:  ¨ Drink plenty of fluids (enough so that your urine is light yellow or clear like water) to prevent dehydration. Choose water and other caffeine-free clear liquids. If you have kidney, heart, or liver disease and have to limit fluids, talk with your doctor before you increase the amount of fluids you drink. ¨ When you are able to eat, try clear soups, mild foods, and liquids until all symptoms are gone for 12 to 48 hours. Other good choices include dry toast, crackers, cooked cereal, and gelatin dessert, such as Jell-O.  · Do not smoke. Smoking can slow healing. If you need help quitting, talk to your doctor about stop-smoking programs and medicines. These can increase your chances of quitting for good. · If you have not already done so, prepare a list of advance directives. Advance directives are instructions to your doctor and family members about what kind of care you want if you become unable to speak or express yourself. Protect your skin  · Always wear sunscreen on exposed skin. Make sure to use a broad-spectrum sunscreen that has a sun protection factor (SPF) of 30 or higher.  Use it every day, even when it is cloudy. While you are outdoors, apply more sunscreen every 2 to 3 hours or anytime your skin gets wet. · Wear a wide-brimmed hat, a long-sleeved shirt, and pants if you are going to be outdoors for very long. · Stay out of the sun during the midday hours (10 a.m. to 4 p.m.), when UV rays are strongest.  · Avoid sunlamps and tanning salons. When should you call for help? Watch closely for changes in your health, and be sure to contact your doctor if:  · You have a skin growth that now looks different than it did before. It may have changed in size, color, shape, or how it looks. · You have a new growth that does not go away. · You have a skin growth that you are worried about. · You notice swelling in your armpits, groin, or neck. Where can you learn more? Go to http://[employer].info/. Enter E970 in the search box to learn more about \"Melanoma: Care Instructions. \"  Current as of: July 26, 2016  Content Version: 11.1  © 3677-0260 [employer]. Care instructions adapted under license by [employer] (which disclaims liability or warranty for this information). If you have questions about a medical condition or this instruction, always ask your healthcare professional. [address] any warranty or liability for your use of this information.     DISCHARGE SUMMARY from Nurse    The following personal items are in your possession at time of discharge:    Dental Appliances: None  Visual Aid: Glasses     Home Medications: None  Jewelry: None  Clothing: Pants, Shirt, Footwear, Socks, Undergarments, Jacket/Coat  Other Valuables: None   PATIENT INSTRUCTIONS:    After general anesthesia or intravenous sedation, for 24 hours or while taking prescription Narcotics:  · Limit your activities  · Do not drive and operate hazardous machinery  · Do not make important personal or business decisions  · Do  not drink alcoholic beverages  · If you have not urinated within 8 hours after discharge, please contact your surgeon on call. Report the following to your surgeon:  · Excessive pain, swelling, redness or odor of or around the surgical area  · Temperature over 100.5  · Nausea and vomiting lasting longer than 4 hours or if unable to take medications  · Any signs of decreased circulation or nerve impairment to extremity: change in color, persistent  numbness, tingling, coldness or increase pain  · Any questions    *  Please give a list of your current medications to your Primary Care Provider. *  Please update this list whenever your medications are discontinued, doses are      changed, or new medications (including over-the-counter products) are added. *  Please carry medication information at all times in case of emergency situations. These are general instructions for a healthy lifestyle:    No smoking/ No tobacco products/ Avoid exposure to second hand smoke    Surgeon General's Warning:  Quitting smoking now greatly reduces serious risk to your health. Obesity, smoking, and sedentary lifestyle greatly increases your risk for illness    A healthy diet, regular physical exercise & weight monitoring are important for maintaining a healthy lifestyle    You may be retaining fluid if you have a history of heart failure or if you experience any of the following symptoms:  Weight gain of 3 pounds or more overnight or 5 pounds in a week, increased swelling in our hands or feet or shortness of breath while lying flat in bed. Please call your doctor as soon as you notice any of these symptoms; do not wait until your next office visit. Recognize signs and symptoms of STROKE:    F-face looks uneven    A-arms unable to move or move unevenly    S-speech slurred or non-existent    T-time-call 911 as soon as signs and symptoms begin-DO NOT go       Back to bed or wait to see if you get better-TIME IS BRAIN.     Warning Signs of HEART ATTACK     Call 911 if you have these symptoms:   Chest discomfort. Most heart attacks involve discomfort in the center of the chest that lasts more than a few minutes, or that goes away and comes back. It can feel like uncomfortable pressure, squeezing, fullness, or pain.  Discomfort in other areas of the upper body. Symptoms can include pain or discomfort in one or both arms, the back, neck, jaw, or stomach.  Shortness of breath with or without chest discomfort.  Other signs may include breaking out in a cold sweat, nausea, or lightheadedness. Don't wait more than five minutes to call 911 - MINUTES MATTER! Fast action can save your life. Calling 911 is almost always the fastest way to get lifesaving treatment. Emergency Medical Services staff can begin treatment when they arrive -- up to an hour sooner than if someone gets to the hospital by car. The discharge information has been reviewed with the patient and spouse. The patient and spouse verbalized understanding. Discharge medications reviewed with the patient and spouse and appropriate educational materials and side effects teaching were provided. Tramadol (By mouth)   Tramadol (TRAM-a-dol)  Treats moderate to severe pain. This medicine is a narcotic pain reliever. Brand Name(s):ConZip, FusePaq Synapryn, Ryzolt, Ultram, Ultram ER, traMADol HCl   There may be other brand names for this medicine. When This Medicine Should Not Be Used: This medicine is not right for everyone. Do not use if you had an allergic reaction to tramadol or other narcotic medicine. Tell your doctor if you have severe asthma or other breathing problems. How to Use This Medicine:   Liquid, Capsule, Tablet, Dissolving Tablet, Long Acting Tablet  · Take your medicine as directed. Your dose may need to be changed several times to find what works best for you. · Make sure your hands are dry before you handle the disintegrating tablet.  Peel back the foil from the blister pack, then remove the tablet. Do not push the tablet through the foil. Place the tablet in your mouth. After it has melted, swallow or take a drink of water. · Swallow the extended-release tablet whole. Do not crush, break, or chew it. · Drink plenty of liquids to help avoid constipation. · Missed dose: Take a dose as soon as you remember. If it is almost time for your next dose, wait until then and take a regular dose. Do not take extra medicine to make up for a missed dose. · Store the medicine in a closed container at room temperature, away from heat, moisture, and direct light. Drugs and Foods to Avoid:   Ask your doctor or pharmacist before using any other medicine, including over-the-counter medicines, vitamins, and herbal products. · Some medicines and foods can affect how tramadol works. Tell your doctor if you are using an MAO inhibitor or medicine for depression (such as amitriptyline, fluoxetine, paroxetine). · Tell your doctor if you are also using any of the following:  ¨ Gordon's wort, digoxin, erythromycin, ketoconazole, linezolid, lithium, quinidine, rifampin, promethazine, carbamazepine, or cyclobenzaprine  ¨ Blood thinner (such as warfarin), medicine for migraine headaches, or another narcotic medicine  · Tell your doctor if you use anything else that makes you sleepy. Some examples are allergy medicine, narcotic pain medicine, and alcohol. · Do not drink alcohol while you are using this medicine. Warnings While Using This Medicine:   · Tell your doctor if you are pregnant or breastfeeding, or if you have kidney disease, liver disease (including cirrhosis), or breathing problems. Tell your doctor if you have a history of head injury, seizures, drug addiction, or depression or similar emotional problems. · This medicine may cause the following problems:  ¨ High risk of overdose  ¨ Serotonin syndrome  · This medicine may make you dizzy or drowsy.  Do not drive or doing anything else that could be dangerous until you know how this medicine affects you. · This medicine can be habit-forming. Do not use more than your prescribed dose. Call your doctor if you think your medicine is not working. · Do not stop using this medicine suddenly. Your doctor will need to slowly decrease your dose before you stop it completely. · Tell any doctor or dentist who treats you that you are using this medicine. · Some forms of this medicine contain phenylalanine (aspartame). · This medicine may cause constipation, especially with long-term use. Ask your doctor if you should use a laxative to prevent and treat constipation. · Keep all medicine out of the reach of children. Never share your medicine with anyone. Possible Side Effects While Using This Medicine:   Call your doctor right away if you notice any of these side effects:  · Allergic reaction: Itching or hives, swelling in your face or hands, swelling or tingling in your mouth or throat, chest tightness, trouble breathing  · Anxiety, restlessness, fast heartbeat, fever, sweating, muscle spasms, nausea, vomiting, diarrhea, seeing or hearing things that are not there  · Blistering, peeling, or red skin rash  · Lightheadedness, dizziness, or fainting  · Seizures  · Severe sleepiness or unusual drowsiness  · Trouble breathing  If you notice these less serious side effects, talk with your doctor:   · Feeling nervous or shaky  · Headache  · Mild itching  · Nausea, vomiting, constipation, loss of appetite  · Weakness  If you notice other side effects that you think are caused by this medicine, tell your doctor. Call your doctor for medical advice about side effects. You may report side effects to FDA at 7-409-NXR-7519  © 2016 [address] is for End User's use only and may not be sold, redistributed or otherwise used for commercial purposes. The above information is an  only.  It is not intended as medical advice for individual conditions or treatments. Talk to your doctor, nurse or pharmacist before following any medical regimen to see if it is safe and effective for you.

## 2017-03-17 NOTE — OP NOTES
[address]    Name:  Swathi Guerra  MR#:  [id number]  :  1980  Account #:  [de-identified]  Date of Adm:  2017  Date of Surgery:  2017      PREOPERATIVE DIAGNOSIS: Skin lesion, left thigh. POSTOPERATIVE DIAGNOSIS: Skin lesion, left thigh. PROCEDURE PERFORMED: Excisional biopsy of lesion of the left  thigh. SURGEON: Андрей Sykes MD.    ASSISTANT: Konstantin Harper. ANESTHESIA: General and local (0.25% Marcaine with epinephrine). FINDINGS: Skin lesions. SPECIMENS REMOVED: Skin lesion. ESTIMATED BLOOD LOSS: 5 mL. FLUIDS GIVEN: 700 mL. OPERATIVE INDICATION: The patient with a dark brown pigmented  lesion of the medial thigh that has been increasing in size and is of  irregular borders. DESCRIPTION OF PROCEDURE: The patient was identified in the  holding area, where consent for excisional biopsy of left thigh mass  was verified. In the operating room, the patient was placed under  general anesthesia. The left thigh was prepped and draped in sterile  fashion using chlorhexidine solution and sterile drapes. Time-out was  performed to ensure correct procedure. The local anesthetic was  infiltrated into the skin and deep dermal tissues surrounding the 1-cm  mass. A 3-cm elliptical incision was made with 0.5 cm smallest margin  carried down through skin and subcutaneous tissue down to the fascia  of the quadriceps muscle using electrocautery in order to perform a  wide local excision of this potentially melanotic mass. The specimen  was passed off the table to be sent for pathology. Irrigation was  performed using saline solution. Electrocautery was used for  hemostasis. A 3-0 Vicryl suture was used to reapproximate the deep  dermal tissues. A 3-0 nylon suture was then used to create a running  subcuticular closure. Dermabond was used as a wound sealant. The  patient tolerated the procedure very well.     DISPOSITION: He was extubated and stable upon transport to the  recovery room.         MD Kenia Gould / PENG  D:  03/17/2017   13:20  T:  03/17/2017   13:38  Job #:  [id number]

## 2017-03-17 NOTE — H&P (VIEW-ONLY)
General Surgery Consult    Subjective:      HPI: Patient is a very pleasant 39-year-old male with a past medical history is remarkable for lumbar disc disease, migraine headaches and vitamin D deficiency. He is self-referred for evaluation and management of a lesion in the medial left thigh. States that the mass first appeared approximately 2 years ago is a palpable soft tissue mass. The skin above it then started to turn brown. It has slowly increase in size. Patient Active Problem List    Diagnosis Date Noted    Elevated blood-pressure reading without diagnosis of hypertension 03/22/2013    Mixed hyperlipidemia 03/08/2010    Hyperglycemia 03/08/2010    Headache, migraine 03/08/2010    Lumbar disc disease 03/08/2010    Vitamin D Deficiency Disease (17) 03/08/2010     Past Medical History:   Diagnosis Date    Headache, migraine 3/8/2010    Lumbar disc disease 3/8/2010    Vitamin D deficiency 3/8/2010      Past Surgical History:   Procedure Laterality Date    HX BACK SURGERY      L3-L4  (Dr. Marla Wilcox), [facility]    HX TONSILLECTOMY  12-10-13    Dr. Naa Vega [facility])      Family History   Problem Relation Age of Onset    High Cholesterol Mother     High Cholesterol Maternal Grandmother     Hypertension Maternal Grandmother     Heart Disease Maternal Grandfather 59     [facility]- s/p CABG      Social History   Substance Use Topics    Smoking status: Never Smoker    Smokeless tobacco: Never Used    Alcohol use Yes      Comment: 2 cans beer a week      No Known Allergies    Prior to Admission medications    Medication Sig Start Date End Date Taking? Authorizing Provider   ibuprofen (MOTRIN) 800 mg tablet Take  by mouth. Yes Historical Provider   cyclobenzaprine (FLEXERIL) 5 mg tablet Take 1 Tab by mouth three (3) times daily as needed. Indications: FIBROMYALGIA, MUSCLE SPASM 11/27/12  Yes Kathleen Dickerson MD   SUMAtriptan (IMITREX) 50 mg tablet Take 1 Tab by mouth once as needed for Migraine. 11/27/12   Terri Castro MD       Review of Systems:    14 systems were reviewed. The results are as above in the HPI and otherwise negative. Objective:     Vitals:    03/07/17 1111   BP: 135/90   Pulse: 86   Resp: 18   Temp: 98.1 °F (36.7 °C)   TempSrc: Oral   SpO2: 99%   Weight: 103.9 kg (229 lb)   Height: 5' 10\" (1.778 m)       Physical Exam:  GENERAL: alert, cooperative, no distress, appears stated age,   EYE: conjunctivae/corneas clear. PERRL, EOM's intact. THROAT & NECK: normal and no erythema or exudates noted. ,    LYMPHATIC: Cervical, supraclavicular, and axillary nodes normal. ,   LUNG: clear to auscultation bilaterally,   HEART: regular rate and rhythm, S1, S2 normal, no murmur, click, rub or gallop,   ABDOMEN: soft, non-tender. Bowel sounds normal. No masses,  no organomegaly,   EXTREMITIES:  extremities normal, atraumatic, no cyanosis or edema,   SKIN: 0.5 cm raised firm lesion in the medial left thigh with brown hyperpigmentation of the skin overlying the lesion. NEUROLOGIC: AOx3. Cranial nerves 2-12 and sensation grossly intact. ,     Data Review:  to be done    Impression:     · Patient with a skin lesion of the left medial thigh of unclear etiology suspicious for malignancy.     Plan:     · Excisional biopsy of the skin lesion of the left medial thigh  · Consent on chart  · Preoperative orders written    Signed By: Saima Babcock MD     March 7, 2017

## 2017-03-17 NOTE — INTERVAL H&P NOTE
H&P Update:  Lester Meeks was seen and examined. History and physical has been reviewed. The patient has been examined.  There have been no significant clinical changes since the completion of the originally dated History and Physical.    Signed By: Ysabel Joiner MD     March 17, 2017 7:09 AM

## 2017-03-17 NOTE — BRIEF OP NOTE
BRIEF OPERATIVE NOTE    Date of Procedure: 3/17/2017   Preoperative Diagnosis: Soft tissue mass [M79.9]  Postoperative Diagnosis: Soft tissue mass [M79.9]    Procedure(s):  EXCISIONAL BIOPSY OF LESION LEFT THIGH   Surgeon(s) and Role:     * Axel Salomon MD - Primary            Surgical Staff:  Circ-1: Adilene Rangel RN  Scrub Tech-1: Christopher Berry  Scrub RN-1: Tana Hernandez  Surg Asst-1: Ruchi Workman  Event Time In   Incision Start 8519   Incision Close 0820     Anesthesia: General   Estimated Blood Loss: 10 mL  Specimens:   ID Type Source Tests Collected by Time Destination   1 : left leg lesion Preservative Leg, Left  Axel Salomon MD 3/17/2017 0662 Pathology      Findings: skin lesion   Complications: None  Implants: * No implants in log *

## 2017-03-17 NOTE — ANESTHESIA PREPROCEDURE EVALUATION
Anesthetic History   No history of anesthetic complications            Review of Systems / Medical History  Patient summary reviewed and pertinent labs reviewed    Pulmonary  Within defined limits                 Neuro/Psych   Within defined limits           Cardiovascular  Within defined limits                     GI/Hepatic/Renal  Within defined limits              Endo/Other        Obesity and arthritis     Other Findings   Comments: Current Smoker? NO       Elective Surgery? Yes       Abstained from smoking 24 hours prior to anesthesia? N/A    Risk Factors for Postoperative nausea/vomiting:       History of postoperative nausea/vomiting? NO       Female? NO       Motion sickness? NO       Intended opioid administration for postoperative analgesia?   YES           Physical Exam    Airway  Mallampati: II  TM Distance: 4 - 6 cm  Neck ROM: normal range of motion   Mouth opening: Normal     Cardiovascular  Regular rate and rhythm,  S1 and S2 normal,  no murmur, click, rub, or gallop             Dental  No notable dental hx       Pulmonary                 Abdominal         Other Findings            Anesthetic Plan    ASA: 2  Anesthesia type: general          Induction: Intravenous  Anesthetic plan and risks discussed with: Patient

## 2017-03-22 NOTE — DISCHARGE SUMMARY
Physician Discharge Summary     Patient ID:  Manju Laureano  [id number]  65 y.o.  1980    Admit Date: 3/17/2017    Discharge Date: 3/17/2017    Admission Diagnoses: Soft tissue mass [M79.9]    Discharge Diagnoses:    Problem List as of 3/17/2017  Date Reviewed: 3/17/2017          Codes Class Noted - Resolved    Elevated blood-pressure reading without diagnosis of hypertension ICD-10-CM: R03.0  ICD-9-CM: 796.2  3/22/2013 - Present        Mixed hyperlipidemia ICD-10-CM: E78.2  ICD-9-CM: 272.2  3/8/2010 - Present        Hyperglycemia ICD-10-CM: R73.9  ICD-9-CM: 790.29  3/8/2010 - Present        Headache, migraine ICD-10-CM: S53.119  ICD-9-CM: 346.90  3/8/2010 - Present        Lumbar disc disease ICD-10-CM: M51.9  ICD-9-CM: 722.93  3/8/2010 - Present        Vitamin D Deficiency Disease (17) ICD-10-CM: E55.9  ICD-9-CM: 268.9  3/8/2010 - Present               Admission Condition: Good    Discharge Condition: Good    Last Procedure: Procedure(s):  EXCISIONAL BIOPSY OF LESION LEFT [address] Course:   Normal hospital course for this procedure. Consults: None    Significant Diagnostic Studies: None    Disposition: home    Patient Instructions:   Current Discharge Medication List      START taking these medications    Details   traMADol (ULTRAM) 50 mg tablet Take 2 Tabs by mouth every six (6) hours as needed for Pain. Max Daily Amount: 400 mg. Qty: 50 Tab, Refills: 0         CONTINUE these medications which have NOT CHANGED    Details   cholecalciferol, vitamin D3, (VITAMIN D3) 2,000 unit tab Take  by mouth daily. DOCOSAHEXANOIC ACID/EPA (FISH OIL PO) Take 2 Caps by mouth. ibuprofen (MOTRIN) 800 mg tablet Take  by mouth. SUMAtriptan (IMITREX) 50 mg tablet Take 1 Tab by mouth once as needed for Migraine. Qty: 10 Tab, Refills: 2    Associated Diagnoses: Headache, migraine      cyclobenzaprine (FLEXERIL) 5 mg tablet Take 1 Tab by mouth three (3) times daily as needed.  Indications: FIBROMYALGIA, MUSCLE SPASM  Qty: 30 Tab, Refills: 1    Associated Diagnoses: Back pain           Activity: See surgical instructions  Diet: Regular Diet  Wound Care: As directed    Follow-up with Dr. Annette Alcaraz in 2 weeks.   Follow-up tests/labs None    Signed:  Zhanna Lee MD  3/17/2017  8:44 AM no

## 2017-03-29 ENCOUNTER — OFFICE VISIT (OUTPATIENT)
Dept: SURGERY | Age: 37
End: 2017-03-29

## 2017-03-29 VITALS
WEIGHT: 229 LBS | HEIGHT: 70 IN | SYSTOLIC BLOOD PRESSURE: 118 MMHG | HEART RATE: 89 BPM | OXYGEN SATURATION: 99 % | RESPIRATION RATE: 16 BRPM | DIASTOLIC BLOOD PRESSURE: 82 MMHG | TEMPERATURE: 98.2 F | BODY MASS INDEX: 32.78 KG/M2

## 2017-03-29 DIAGNOSIS — Z09 POSTOPERATIVE EXAMINATION: Primary | ICD-10-CM

## 2017-03-29 NOTE — PATIENT INSTRUCTIONS
Follow up as needed     Lipoma: Care Instructions  Your Care Instructions  A lipoma is a growth of fat just below the skin. It may feel soft and rubbery. Lipomas can occur anywhere on the body. But they are most common on the torso, neck, upper thighs, upper arms, and armpits. A lipoma does not turn into cancer. Lipomas usually are not treated, because most of them don't hurt or cause problems. But your doctor may remove a lipoma if it is painful, gets infected, or bothers you. Follow-up care is a key part of your treatment and safety. Be sure to make and go to all appointments, and call your doctor if you are having problems. It's also a good idea to know your test results and keep a list of the medicines you take. How can you care for yourself at home? · Lipomas usually need no care at home. · If your doctor removes a lipoma, follow directions for taking care of the cut (incision). When should you call for help? Call your doctor now or seek immediate medical care if:  · You have signs of infection, such as:  ¨ Increased pain, swelling, warmth, or redness. ¨ Red streaks leading from the lipoma. ¨ Pus draining from the lipoma. ¨ A fever. Watch closely for changes in your health, and be sure to contact your doctor if:  · You want to discuss having a lipoma removed. Where can you learn more? Go to http://hubert-vicky.info/. Enter T958 in the search box to learn more about \"Lipoma: Care Instructions. \"  Current as of: October 13, 2016  Content Version: 11.2  © 4748-1523 [employer]. Care instructions adapted under license by [employer] (which disclaims liability or warranty for this information). If you have questions about a medical condition or this instruction, always ask your healthcare professional. [address] any warranty or liability for your use of this information.

## 2017-03-29 NOTE — MR AVS SNAPSHOT
Visit Information Date & Time Provider Department Dept. Phone Encounter #  
 3/29/2017  2:00 PM Chelita Regan NP [facility] [phone] [id number] Upcoming Health Maintenance Date Due DTaP/Tdap/Td series (1 - Tdap) 3/6/2013 INFLUENZA AGE 9 TO ADULT 8/1/2016 Allergies as of 3/29/2017  Review Complete On: 3/29/2017 By: Chelita Regan NP Severity Noted Reaction Type Reactions Hydrocodone  03/08/2017    Swelling, Other (comments) Headaches Swelling of tongue 
insomnia Current Immunizations  Reviewed on 11/20/2014 Name Date Influenza Vaccine 10/1/2014, 10/5/2013 Influenza Vaccine Whole 10/27/2012 Td 3/5/2013 Not reviewed this visit You Were Diagnosed With   
  
 Codes Comments Postoperative examination    -  Primary ICD-10-CM: V63 ICD-9-CM: V67.00 Vitals BP Pulse Temp Resp Height(growth percentile) Weight(growth percentile) 118/82 89 98.2 °F (36.8 °C) (Oral) 16 5' 10\" (1.778 m) 229 lb (103.9 kg) SpO2 BMI Smoking Status 99% 32.86 kg/m2 Never Smoker Vitals History BMI and BSA Data Body Mass Index Body Surface Area  
 32.86 kg/m 2 2.27 m 2 Preferred Pharmacy Pharmacy Name Phone [address] [facility] [address] [phone] Your Updated Medication List  
  
   
This list is accurate as of: 3/29/17  3:46 PM.  Always use your most recent med list.  
  
  
  
  
 cyclobenzaprine 5 mg tablet Commonly known as:  FLEXERIL Take 1 Tab by mouth three (3) times daily as needed. Indications: FIBROMYALGIA, MUSCLE SPASM FISH OIL PO Take 2 Caps by mouth. ibuprofen 800 mg tablet Commonly known as:  MOTRIN Take  by mouth. SUMAtriptan 50 mg tablet Commonly known as:  IMITREX Take 1 Tab by mouth once as needed for Migraine. traMADol 50 mg tablet Commonly known as:  Ian Herrera  
 Take 2 Tabs by mouth every six (6) hours as needed for Pain. Max Daily Amount: 400 mg. VITAMIN D3 2,000 unit Tab Generic drug:  cholecalciferol (vitamin D3) Take  by mouth daily. Patient Instructions Follow up as needed Lipoma: Care Instructions Your Care Instructions A lipoma is a growth of fat just below the skin. It may feel soft and rubbery. Lipomas can occur anywhere on the body. But they are most common on the torso, neck, upper thighs, upper arms, and armpits. A lipoma does not turn into cancer. Lipomas usually are not treated, because most of them don't hurt or cause problems. But your doctor may remove a lipoma if it is painful, gets infected, or bothers you. Follow-up care is a key part of your treatment and safety. Be sure to make and go to all appointments, and call your doctor if you are having problems. It's also a good idea to know your test results and keep a list of the medicines you take. How can you care for yourself at home? · Lipomas usually need no care at home. · If your doctor removes a lipoma, follow directions for taking care of the cut (incision). When should you call for help? Call your doctor now or seek immediate medical care if: 
· You have signs of infection, such as: 
¨ Increased pain, swelling, warmth, or redness. ¨ Red streaks leading from the lipoma. ¨ Pus draining from the lipoma. ¨ A fever. Watch closely for changes in your health, and be sure to contact your doctor if: 
· You want to discuss having a lipoma removed. Where can you learn more? Go to http://hubert-vicky.info/. Enter S270 in the search box to learn more about \"Lipoma: Care Instructions. \" Current as of: October 13, 2016 Content Version: 11.2 © 8462-8989 [employer].  Care instructions adapted under license by [employer] (which disclaims liability or warranty for this information). If you have questions about a medical condition or this instruction, always ask your healthcare professional. [address] any warranty or liability for your use of this information. Patient Instructions History Introducing [facility]! Dear Misti Parr: Thank you for requesting a [employer] account. Our records indicate that you already have an active [employer]Point account. You can access your account anytime at [email or web address] Did you know that you can access your hospital and ER discharge instructions at any time in [employer]Point? You can also review all of your test results from your hospital stay or ER visit. Additional Information If you have questions, please visit the Frequently Asked Questions section of the [employer] website at [email or web address] Remember, [employer]Point is NOT to be used for urgent needs. For medical emergencies, dial 911. Now available from your iPhone and Android! Please provide this summary of care documentation to your next provider. Your primary care clinician is listed as Filemon Heredia. If you have any questions after today's visit, please call [phone].

## 2017-03-29 NOTE — PROGRESS NOTES
Shukri Hagan. Keny Brandon, JAKP-C  PROGRESS NOTE        Subjective:  Mr. Hollie Sy is a very pleasant 38 yo white male who is about a week out from excisional biopsy of lesion to the left medial thigh. He complains of the sutures on each end of the incision rubbing his other leg causing pain and inflammation. He continues to dress these areas with neosporin and a bandaid. He also complains of numbness to the leg and surrounding area. We reviewed his pathology report and discussed at length wound healing and normal post operative wound/nerve pain. Objective:  Vitals:    03/29/17 1415   BP: 118/82   Pulse: 89   Resp: 16   Temp: 98.2 °F (36.8 °C)   TempSrc: Oral   SpO2: 99%   Weight: 103.9 kg (229 lb)   Height: 5' 10\" (1.778 m)       Physical Exam:    General: in no apparent distress, alert, oriented times 3 and cooperative   Incision:   no hernia, no seroma, no swelling, no dehiscence, incision well approximated. No s/s of infection         Current Medications:  Current Outpatient Prescriptions   Medication Sig Dispense Refill    cholecalciferol, vitamin D3, (VITAMIN D3) 2,000 unit tab Take  by mouth daily.  DOCOSAHEXANOIC ACID/EPA (FISH OIL PO) Take 2 Caps by mouth.  ibuprofen (MOTRIN) 800 mg tablet Take  by mouth.  SUMAtriptan (IMITREX) 50 mg tablet Take 1 Tab by mouth once as needed for Migraine. 10 Tab 2    traMADol (ULTRAM) 50 mg tablet Take 2 Tabs by mouth every six (6) hours as needed for Pain. Max Daily Amount: 400 mg. 50 Tab 0    cyclobenzaprine (FLEXERIL) 5 mg tablet Take 1 Tab by mouth three (3) times daily as needed. Indications: FIBROMYALGIA, MUSCLE SPASM 30 Tab 1       Chart and notes reviewed. Data reviewed. I have evaluated and examined the patient.         Impression:    · Well healing excisional biopsy of left medial thigh   Plan:  · Continue to cover for purposes of comfort  · Allow glue to wear itself off  · Follow up as needed     Mr. Hollie Sy has a reminder for a \"due or due soon\" health maintenance. I have asked that he contact his primary care provider for follow-up on this health maintenance. Dinah Escamilla.  Wash Shadow, FNP-C

## (undated) DEVICE — 3M™ STERI-STRIP™ COMPOUND BENZOIN TINCTURE 40 BAGS/CARTON 4 CARTONS/CASE C1544: Brand: 3M™ STERI-STRIP™

## (undated) DEVICE — SUTURE VCRL SZ 3-0 L18IN ABSRB UD POLYGLACTIN 910 BRAID TIE J910T

## (undated) DEVICE — 3M™ BAIR PAWS FLEX™ WARMING GOWN, STANDARD, 20 PER CASE 81003: Brand: BAIR PAWS™

## (undated) DEVICE — 3M™ TEGADERM™ TRANSPARENT FILM DRESSING FRAME STYLE, 1626W, 4 IN X 4-3/4 IN (10 CM X 12 CM), 50/CT 4CT/CASE: Brand: 3M™ TEGADERM™

## (undated) DEVICE — PREP SKN CHLRAPRP APPL 10.5ML --

## (undated) DEVICE — TRAY PREP DRY W/ PREM GLV 2 APPL 6 SPNG 2 UNDPD 1 OVERWRAP

## (undated) DEVICE — (D)GLOVE SURG ORTH 7.5 PWD LTX -- DISC BY MFR USE ITEM 278014

## (undated) DEVICE — FLEX ADVANTAGE 3000CC: Brand: FLEX ADVANTAGE

## (undated) DEVICE — REM POLYHESIVE ADULT PATIENT RETURN ELECTRODE: Brand: VALLEYLAB

## (undated) DEVICE — INTENDED FOR TISSUE SEPARATION, AND OTHER PROCEDURES THAT REQUIRE A SHARP SURGICAL BLADE TO PUNCTURE OR CUT.: Brand: BARD-PARKER SAFETY BLADES SIZE 10, STERILE

## (undated) DEVICE — GAUZE SPONGES,16 PLY: Brand: CURITY

## (undated) DEVICE — 3M™ STERI-STRIP™ REINFORCED ADHESIVE SKIN CLOSURES, R1546, 1/4 IN X 4 IN (6 MM X 100 MM), 10 STRIPS/ENVELOPE: Brand: 3M™ STERI-STRIP™

## (undated) DEVICE — SUTURE COAT VCRL PC 5 PRECIS COSM CONVENTIONAL CUT PRIM 3 8 J823H

## (undated) DEVICE — KIT CLN UP BON SECOURS MARYV

## (undated) DEVICE — SHEET, DRAPE, SPLIT, STERILE: Brand: MEDLINE

## (undated) DEVICE — PACK PROCEDURE SURG MAJ W/ BASIN LF

## (undated) DEVICE — GAUZE SPONGES,USP TYPE VII GAUZE, 12 PLY: Brand: CURITY

## (undated) DEVICE — SUTURE VCRL SZ 3-0 L27IN ABSRB VLT CT-2 L26MM 1/2 CIR J332H